# Patient Record
Sex: FEMALE | Race: WHITE | NOT HISPANIC OR LATINO | Employment: STUDENT | URBAN - METROPOLITAN AREA
[De-identification: names, ages, dates, MRNs, and addresses within clinical notes are randomized per-mention and may not be internally consistent; named-entity substitution may affect disease eponyms.]

---
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
## 2018-01-03 ENCOUNTER — GENERIC CONVERSION - ENCOUNTER (OUTPATIENT)
Dept: OTHER | Facility: OTHER | Age: 18
End: 2018-01-03

## 2018-01-03 ENCOUNTER — APPOINTMENT (OUTPATIENT)
Dept: LAB | Facility: AMBULARY SURGERY CENTER | Age: 18
End: 2018-01-03
Attending: OBSTETRICS & GYNECOLOGY
Payer: COMMERCIAL

## 2018-01-03 DIAGNOSIS — Z11.3 ENCOUNTER FOR SCREENING FOR INFECTIONS WITH PREDOMINANTLY SEXUAL MODE OF TRANSMISSION: ICD-10-CM

## 2018-01-03 LAB
CHLAMYDIA DNA CVX QL NAA+PROBE: NORMAL
HBV SURFACE AG SER QL: NORMAL
N GONORRHOEA DNA GENITAL QL NAA+PROBE: NORMAL

## 2018-01-03 PROCEDURE — 87491 CHLMYD TRACH DNA AMP PROBE: CPT

## 2018-01-03 PROCEDURE — 87591 N.GONORRHOEAE DNA AMP PROB: CPT

## 2018-01-03 PROCEDURE — 36415 COLL VENOUS BLD VENIPUNCTURE: CPT

## 2018-01-03 PROCEDURE — 87389 HIV-1 AG W/HIV-1&-2 AB AG IA: CPT

## 2018-01-03 PROCEDURE — 87340 HEPATITIS B SURFACE AG IA: CPT

## 2018-01-03 PROCEDURE — 86592 SYPHILIS TEST NON-TREP QUAL: CPT

## 2018-01-04 LAB — RPR SER QL: NORMAL

## 2018-01-05 LAB — HIV 1+2 AB+HIV1 P24 AG SERPL QL IA: NORMAL

## 2018-01-10 ENCOUNTER — GENERIC CONVERSION - ENCOUNTER (OUTPATIENT)
Dept: OTHER | Facility: OTHER | Age: 18
End: 2018-01-10

## 2018-01-12 NOTE — PROGRESS NOTES
Assessment    1  Never a smoker   2  Encounter for preventive health examination (V70 0) (Z00 00)   3  Exercise-induced asthma (493 81) (J06 990)    Plan   Exercise-induced asthma    · Ventolin  (90 Base) MCG/ACT Inhalation Aerosol Solution; INHALE 2  PUFFS EVERY 4-6 HOURS AS NEEDED   · Avoid  exposure to cigarette smoke ; Status:Complete;   Done: 78HGW3722   · Avoid exposure to household dust, animal dander, and molds ; Status:Complete;   Done:  63LAA3395   · Avoid exposure to things that make your problem worse ; Status:Complete;   Done:  52RPV7458   · Monitor daily; Status:Active; Requested for:06Fgo2261;    · Several things can be done to allergy-proof the bedroom:; Status:Complete;   Done:  54IVI3717   · To use an inhaler:; Status:Complete;   Done: 94UDI5781   · Call 911 if: Nuala Crass are too short of breath to talk, you can speak only one or two words  between breaths, or your lips or nails look blue ; Status:Complete;   Done: 56PII6211   · Seek Immediate Medical Attention if: You are having trouble staying awake ;  Status:Complete;   Done: 06MTC0831   · Seek Immediate Medical Attention if: You have difficulty breathing, or you are short of  breath more often ; Status:Complete;   Done: 68MXN3128   · Seek Immediate Medical Attention if: Your chest pain is getting worse ; Status:Complete;    Done: 16RGG3414  Health Maintenance    · Always use a seat belt and shoulder strap when riding or driving a motor vehicle ;  Status:Complete;   Done: 73DCH5361   · Begin a limited exercise program ; Status:Complete;   Done: 50MPG3486   · Begin or continue regular aerobic exercise   Gradually work up to at least 3 sessions of 30  minutes of exercise a week ; Status:Complete;   Done: 20DUN2361   · Brush your teeth 3 times a day and floss at least once a day ; Status:Complete;   Done:  67DPF2050   · Drink plenty of fluids ; Status:Complete;   Done: 15QNF9620   · Eat a normal well-balanced diet ; Status:Complete;   Done: 94YZH5786   · Stretch and warm up your muscles during the first 10 minutes , then cool down your  muscles for the last 10 minutes of exercise ; Status:Complete;   Done: 05DGH3357   · There are many ways to reduce your risk of catching or spreading a sexually transmitted  Infection ; Status:Complete;   Done: 50PYF9150   · Use a sun block product with an SPF of 15 or more ; Status:Complete;   Done:  95PZH7940   · We encourage all of our patients to exercise regularly  30 minutes of exercise or physical  activity five or more days a week is recommended for children and adults ;  Status:Complete;   Done: 77NZP3623   · We recommend regular contraceptive use to prevent an unplanned pregnancy ;  Status:Complete;   Done: 99YHK3629   · We recommend routine visits to a dentist ; Status:Complete;   Done: 28DLE1514   · We recommend that you follow these rules for gun safety ; Status:Complete;   Done:  23NHM3024   · Follow-up visit in 1 year Evaluation and Treatment  Follow-up  Status: Complete  Done:  09HDY3687    SNELLEN VISION- POC; Status:Resulted - Requires Verification;   Done: 36FZT3527 12:00AM  Due:73Phu1310; Last Updated Willam Hanks; 8/14/2017 11:16:01 AM;Ordered; Today;    For:Health Maintenance; Ordered By:Iris Salas;      Discussion/Summary  health maintenance visit Currently, she eats a healthy diet  The immunizations are up to date  Advice and education were given regarding nutrition, aerobic exercise, vitamin D supplements, reproductive health, contraception, sunscreen use, self skin examination, helmet use and seat belt use  Patient discussion: discussed with the patient  The patient was counseled regarding instructions for management, patient and family education  The treatment plan was reviewed with the patient/guardian   The patient/guardian understands and agrees with the treatment plan      Chief Complaint  21 yr old patient present today for wellness exam       History of Present Illness  HM, Adult Female: The patient is being seen for a health maintenance evaluation  The last health maintenance visit was 2 year(s) ago  General Health: The patient's health since the last visit is described as good  She has regular dental visits  The patient brushes 2 time(s) a day  Lifestyle:  She consumes a diverse and healthy diet  She does not have any weight concerns  She exercises regularly  She does not use tobacco  She denies alcohol use  She denies drug use  Reproductive health:  she reports normal menses  she is not sexually active  Screening:      Review of Systems    Constitutional: no fever  Eyes: no eyesight problems  ENT: no sore throat and no nasal discharge  Respiratory: no cough and no wheezing  Gastrointestinal: no abdominal pain, no nausea, no vomiting, no constipation and no diarrhea  Genitourinary: no dysuria  Musculoskeletal: no myalgias  Integumentary: no rashes  Neurological: no headache  Active Problems    1  Exercise-induced asthma (493 81) (J45 990)    Past Medical History    · History of gastroenteritis (V12 79) (Z87 19)   · History of headache (V13 89) (Z87 898)   · History of pneumonia (V12 61) (Z87 01)   · History of Mononucleosis (075) (B27 90)    Surgical History    · History of Oral Surgery Tooth Extraction    Family History  Mother    · No pertinent family history  Father    · No pertinent family history    Social History    · Activities: Basketball   · Activities: Soccer   · Brushes teeth daily   · Never a smoker   · No tobacco/smoke exposure   · Seeing a dentist   · Sleeps 8 - 10 hours a day   · Student    Current Meds   1  AeroChamber Mini Chamber Device; USE WITH VENTOLIN HFA AS DIRECTED; Therapy: 21HNK5973 to (Evaluate:04Ssp0947)  Requested for: 20Jul2015; Last   Rx:20Jul2015 Ordered   2  Ventolin  (90 Base) MCG/ACT Inhalation Aerosol Solution; 2 puffs 20 min prior to   exercise;    Therapy: 63Ums3669 to (Last Rx:20Jul2015) Requested for: 22Jaa2793 Ordered    Allergies    1  No Known Drug Allergies    2  No Known Environmental Allergies   3  No Known Food Allergies    Vitals   Recorded: [id number] 10:59AM Recorded: 58ZRV9703 10:50AM   Heart Rate 82    Respiration 18    Systolic 260    Diastolic 64    Height  5 ft 7 25 in   Weight  162 lb    BMI Calculated  25 18   BSA Calculated  1 85   BMI Percentile  80 %   2-20 Stature Percentile  88 %   2-20 Weight Percentile  89 %     Physical Exam    Constitutional   General appearance: No acute distress, well appearing and well nourished  Head and Face   Head and face: Normal     Palpation of the face and sinuses: No sinus tenderness  Eyes   Conjunctiva and lids: No swelling, erythema or discharge  Pupils and irises: Equal, round, reactive to light  Ears, Nose, Mouth, and Throat   External inspection of ears and nose: Normal     Otoscopic examination: Tympanic membranes translucent with normal light reflex  Canals patent without erythema  Nasal mucosa, septum, and turbinates: Normal without edema or erythema  Lips, teeth, and gums: Normal, good dentition  Oropharynx: Normal with no erythema, edema, exudate or lesions  Neck   Neck: Supple, symmetric, trachea midline, no masses  Pulmonary   Respiratory effort: No increased work of breathing or signs of respiratory distress  Auscultation of lungs: Clear to auscultation  Cardiovascular   Auscultation of heart: Normal rate and rhythm, normal S1 and S2, no murmurs  Pedal pulses: 2+ bilaterally  Examination of extremities for edema and/or varicosities: Normal     Chest   Breasts: Normal, no dimpling or skin changes appreciated  Palpation of breasts and axillae: Normal, no masses palpated  Abdomen   Abdomen: Non-tender, no masses  Liver and spleen: No hepatomegaly or splenomegaly  Lymphatic   Palpation of lymph nodes in neck: No lymphadenopathy      Musculoskeletal   Gait and station: Normal     Digits and nails: Normal without clubbing or cyanosis  Joints, bones, and muscles: Normal     Range of motion: Normal     Muscle strength/tone: Normal     Skin   Skin and subcutaneous tissue: Normal without rashes or lesions  Neurologic   Sensation: No sensory loss  Psychiatric   Mood and affect: Normal        Signatures   Electronically signed by : Ibis Osullivan;  Aug 14 2017 11:46AM EST                       (Author)    Electronically signed by : Benito Parr MD; Aug 14 2017 10:07PM EST                       (Co-author)

## 2018-01-14 VITALS
WEIGHT: 162 LBS | DIASTOLIC BLOOD PRESSURE: 64 MMHG | SYSTOLIC BLOOD PRESSURE: 102 MMHG | HEIGHT: 67 IN | BODY MASS INDEX: 25.43 KG/M2 | RESPIRATION RATE: 18 BRPM | HEART RATE: 82 BPM

## 2018-01-23 NOTE — MISCELLANEOUS
Message   Recorded as Task   Date: 01/07/2018 03:41 PM, Created By: Johnanna Dubin   Task Name: Follow Up   Assigned To: Vito Slaughter   Regarding Patient: Melly Holder, Status: Active   CommentKermitt Foyer - 07 Jan 2018 3:41 PM     TASK CREATED  please call  STI testing all neg   Vito Slaughter - 08 Jan 2018 1:43 PM     TASK EDITED  Mailbox is full unable to leave message at this time  Vito Slaughter - 10 Sandor 2018 2:36 PM     TASK EDITED  Spoke with patient reviewed negative STI testing  Pt verbalized understanding  Active Problems    1  Contraception (V25 9) (Z30 9)   2  Exercise-induced asthma (493 81) (J45 990)   3  Screen for STD (sexually transmitted disease) (V74 5) (Z11 3)    Current Meds   1  AeroChamber Mini Chamber Device; USE WITH VENTOLIN HFA AS DIRECTED; Therapy: 62AKO0159 to (Evaluate:35Aiu7615)  Requested for: 06Ivx9104; Last   Rx:18Ghe9064 Ordered   2  Ventolin  (90 Base) MCG/ACT Inhalation Aerosol Solution; 2 puffs 20 min prior to   exercise; Therapy: 14Mlt0657 to (Last Rx:57Nzt5296)  Requested for: 46Ina4576 Ordered   3  Ventolin  (90 Base) MCG/ACT Inhalation Aerosol Solution; INHALE 2 PUFFS   EVERY 4-6 HOURS AS NEEDED; Therapy: 96XTR7479 to (Last Rx:26Grn8124)  Requested for: 20Jox5300 Ordered    Allergies    1  No Known Drug Allergies    2  No Known Environmental Allergies   3   No Known Food Allergies    Signatures   Electronically signed by : Latrice Gilliam, ; Sandor 10 2018  2:36PM EST                       (Author)

## 2018-01-24 VITALS
RESPIRATION RATE: 16 BRPM | BODY MASS INDEX: 25.11 KG/M2 | WEIGHT: 160 LBS | DIASTOLIC BLOOD PRESSURE: 82 MMHG | HEIGHT: 67 IN | SYSTOLIC BLOOD PRESSURE: 130 MMHG | HEART RATE: 64 BPM

## 2018-04-10 ENCOUNTER — OFFICE VISIT (OUTPATIENT)
Dept: OBGYN CLINIC | Facility: CLINIC | Age: 18
End: 2018-04-10
Payer: COMMERCIAL

## 2018-04-10 VITALS — BODY MASS INDEX: 26.49 KG/M2 | HEIGHT: 67 IN | WEIGHT: 168.8 LBS

## 2018-04-10 DIAGNOSIS — Z30.015 ENCOUNTER FOR INITIAL PRESCRIPTION OF VAGINAL RING HORMONAL CONTRACEPTIVE: Primary | ICD-10-CM

## 2018-04-10 PROCEDURE — 99214 OFFICE O/P EST MOD 30 MIN: CPT | Performed by: OBSTETRICS & GYNECOLOGY

## 2018-04-10 RX ORDER — ETONOGESTREL AND ETHINYL ESTRADIOL 11.7; 2.7 MG/1; MG/1
INSERT, EXTENDED RELEASE VAGINAL
Qty: 1 EACH | Refills: 3 | Status: SHIPPED | OUTPATIENT
Start: 2018-04-10

## 2018-04-10 RX ORDER — ALBUTEROL SULFATE 90 UG/1
AEROSOL, METERED RESPIRATORY (INHALATION)
COMMUNITY
Start: 2014-08-28

## 2018-04-10 RX ORDER — ETONOGESTREL AND ETHINYL ESTRADIOL 11.7; 2.7 MG/1; MG/1
INSERT, EXTENDED RELEASE VAGINAL
Qty: 1 EACH | Refills: 0 | Status: SHIPPED | COMMUNITY
Start: 2018-04-10 | End: 2018-08-14 | Stop reason: SDUPTHER

## 2018-04-10 NOTE — PROGRESS NOTES
Assessment     21 y o , changing to NuvaRing vaginal inserts, no contraindications  Time spent counseling -30 min regarding the various forms of contraception  We reviewed oral contraceptive pills, NuvaRing, birth control patch, Depo-Provera, Nexplanon and IUDs  Given that the patient has had such difficulty remembering to take her birth control pills she would like something that is less user dependent  After reviewing the risks and benefits of all forms of contraception she would like to use the NuvaRing  We reviewed when to start the NuvaRing and had a use it  We reviewed with the most common side effects  She will return in 3 months for follow-up    Plan  Start NuvaRing  Follow-up in 3 months    Subjective      Gunner Mak is a 21 y o  female who presents for contraception counseling  The patient has no complaints today  She does report that she stop the birth control pill though because she was often forgetting them  She had no problems will taking the pill other than forgetting to take the pill  She would like to discuss alternative forms of birth control that are less user dependent  The patient is sexually active  Pertinent past medical history: none  Menstrual History:  OB History      Para Term  AB Living    0 0 0 0 0 0    SAB TAB Ectopic Multiple Live Births    0 0 0 0 0         No LMP recorded (within months)  The following portions of the patient's history were reviewed and updated as appropriate: allergies, current medications, past family history, past medical history, past social history, past surgical history and problem list     Review of Systems  Pertinent items are noted in HPI       Objective      Ht 5' 7" (1 702 m)   Wt 76 6 kg (168 lb 12 8 oz)   LMP  (Within Months) Comment: within last month  BMI 26 44 kg/m²     General:   alert and oriented, in no acute distress   Heart: regular rate and rhythm, S1, S2 normal, no murmur, click, rub or gallop   Lungs: clear to auscultation bilaterally   Abdomen: soft

## 2018-08-14 ENCOUNTER — OFFICE VISIT (OUTPATIENT)
Dept: OBGYN CLINIC | Facility: CLINIC | Age: 18
End: 2018-08-14
Payer: COMMERCIAL

## 2018-08-14 VITALS
SYSTOLIC BLOOD PRESSURE: 110 MMHG | HEIGHT: 67 IN | DIASTOLIC BLOOD PRESSURE: 66 MMHG | WEIGHT: 165 LBS | BODY MASS INDEX: 25.9 KG/M2

## 2018-08-14 DIAGNOSIS — Z30.015 ENCOUNTER FOR INITIAL PRESCRIPTION OF VAGINAL RING HORMONAL CONTRACEPTIVE: ICD-10-CM

## 2018-08-14 PROCEDURE — 99213 OFFICE O/P EST LOW 20 MIN: CPT | Performed by: OBSTETRICS & GYNECOLOGY

## 2018-08-14 RX ORDER — ETONOGESTREL AND ETHINYL ESTRADIOL 11.7; 2.7 MG/1; MG/1
INSERT, EXTENDED RELEASE VAGINAL
Qty: 3 EACH | Refills: 3 | Status: SHIPPED | OUTPATIENT
Start: 2018-08-14

## 2018-08-14 NOTE — PROGRESS NOTES
Assessment     19 y o , continuing NuvaRing vaginal inserts, no contraindications  Plan     Continue NuvaRing    Follow-up in 1 year for initiation of Pap screening    Shanelle Hyde is a 19 y o  female who presents for contraception follow-up  She has been using the NuvaRing for the last 3 months  The patient has no complaints today  She has a regular monthly periods that last approximately 5 days  It is not heavy  She has no intermenstrual spotting  She has no difficulty remembering to place or change the ring  The ring does not fall out  The patient is sexually active  She declines STI screening  She would like to continue the NuvaRing  Pertinent past medical history: none  Menstrual History:  OB History      Para Term  AB Living    0 0 0 0 0 0    SAB TAB Ectopic Multiple Live Births    0 0 0 0 0         Patient's last menstrual period was 2018         The following portions of the patient's history were reviewed and updated as appropriate: allergies, current medications, past family history, past medical history, past social history, past surgical history and problem list     Review of Systems  Constitutional: negative  Respiratory: negative  Cardiovascular: negative  Gastrointestinal: negative  Genitourinary:negative  Neurological: negative  Behavioral/Psych: negative     Objective      /66 (BP Location: Right arm, Patient Position: Sitting, Cuff Size: Standard)   Ht 5' 6 5" (1 689 m)   Wt 74 8 kg (165 lb)   LMP 2018   BMI 26 23 kg/m²     General:   alert and oriented, in no acute distress   Heart: regular rate and rhythm, S1, S2 normal, no murmur, click, rub or gallop   Lungs: clear to auscultation bilaterally   Abdomen: soft, non-tender, without masses or organomegaly

## 2018-08-20 ENCOUNTER — TELEPHONE (OUTPATIENT)
Dept: OBGYN CLINIC | Facility: CLINIC | Age: 18
End: 2018-08-20

## 2018-08-20 NOTE — TELEPHONE ENCOUNTER
Patient left message on nurse line state her nuva ring prescription needs to be sent to mail order pharmacy  Returned call to patient  Pt is unsure what mail order pharmacy she is to use  Will call back with the correct pharmacy name

## 2018-08-23 NOTE — TELEPHONE ENCOUNTER
Patient called back would like prescription sent to [facility]  Advised to have [facility] transfer prescription from [facility]  Verbalized understanding

## 2021-02-11 DIAGNOSIS — Z23 ENCOUNTER FOR IMMUNIZATION: ICD-10-CM

## 2021-02-15 ENCOUNTER — IMMUNIZATIONS (OUTPATIENT)
Dept: FAMILY MEDICINE CLINIC | Facility: HOSPITAL | Age: 21
End: 2021-02-15

## 2021-02-15 DIAGNOSIS — Z23 ENCOUNTER FOR IMMUNIZATION: Primary | ICD-10-CM

## 2021-02-15 PROCEDURE — 0001A SARS-COV-2 / COVID-19 MRNA VACCINE (PFIZER-BIONTECH) 30 MCG: CPT

## 2021-02-15 PROCEDURE — 91300 SARS-COV-2 / COVID-19 MRNA VACCINE (PFIZER-BIONTECH) 30 MCG: CPT

## 2021-03-08 ENCOUNTER — IMMUNIZATIONS (OUTPATIENT)
Dept: FAMILY MEDICINE CLINIC | Facility: HOSPITAL | Age: 21
End: 2021-03-08

## 2021-03-08 DIAGNOSIS — Z23 ENCOUNTER FOR IMMUNIZATION: Primary | ICD-10-CM

## 2021-03-08 PROCEDURE — 91300 SARS-COV-2 / COVID-19 MRNA VACCINE (PFIZER-BIONTECH) 30 MCG: CPT

## 2021-03-08 PROCEDURE — 0002A SARS-COV-2 / COVID-19 MRNA VACCINE (PFIZER-BIONTECH) 30 MCG: CPT

## 2022-09-01 ENCOUNTER — OFFICE VISIT (OUTPATIENT)
Dept: INTERNAL MEDICINE CLINIC | Facility: CLINIC | Age: 22
End: 2022-09-01
Payer: COMMERCIAL

## 2022-09-01 VITALS
HEART RATE: 83 BPM | OXYGEN SATURATION: 98 % | WEIGHT: 164 LBS | SYSTOLIC BLOOD PRESSURE: 114 MMHG | DIASTOLIC BLOOD PRESSURE: 60 MMHG | HEIGHT: 67 IN | TEMPERATURE: 97.7 F | RESPIRATION RATE: 16 BRPM | BODY MASS INDEX: 25.74 KG/M2

## 2022-09-01 DIAGNOSIS — Z00.00 LABORATORY EXAMINATION ORDERED AS PART OF A ROUTINE GENERAL MEDICAL EXAMINATION: ICD-10-CM

## 2022-09-01 DIAGNOSIS — N92.6 IRREGULAR MENSTRUAL BLEEDING: ICD-10-CM

## 2022-09-01 DIAGNOSIS — Z72.0 VAPES NICOTINE CONTAINING SUBSTANCE: ICD-10-CM

## 2022-09-01 DIAGNOSIS — Z00.00 HEALTH MAINTENANCE EXAMINATION: Primary | ICD-10-CM

## 2022-09-01 DIAGNOSIS — D22.9 MULTIPLE NEVI: ICD-10-CM

## 2022-09-01 DIAGNOSIS — R19.7 DIARRHEA, UNSPECIFIED TYPE: ICD-10-CM

## 2022-09-01 PROCEDURE — 3725F SCREEN DEPRESSION PERFORMED: CPT | Performed by: INTERNAL MEDICINE

## 2022-09-01 PROCEDURE — 99385 PREV VISIT NEW AGE 18-39: CPT | Performed by: INTERNAL MEDICINE

## 2022-09-01 NOTE — PROGRESS NOTES
Assessment/Plan:    Diarrhea  Differential Dx: lactose or gluten sensitivity, IBS with diarrhea  Recommend to start food diary  Screen for Celiac  Refer to GI  Irregular menstrual bleeding  Previously on Nuvaring  Refer back to gynecology  Exercise-induced asthma  No symptoms  Multiple nevi  Recommend to see dermatology  (+) family history of melanoma  Vapes nicotine containing substance  Discussed smoking cessation  Recommend nicotine lozenges  Diagnoses and all orders for this visit:    Health maintenance examination  Comments:  Updated  Diarrhea, unspecified type  -     CBC and differential  -     Comprehensive metabolic panel  -     Celiac Disease Antibody Profile  -     Ambulatory Referral to Gastroenterology; Future    Irregular menstrual bleeding  -     TSH, 3rd generation with Free T4 reflex    Laboratory examination ordered as part of a routine general medical examination  -     CBC and differential  -     Comprehensive metabolic panel  -     TSH, 3rd generation with Free T4 reflex  -     Celiac Disease Antibody Profile    Multiple nevi  -     Ambulatory Referral to Dermatology; Future    Vapes nicotine containing substance    Follow up in 6 months or as needed  Subjective:      Patient ID: Jamal Collet is a 23 y o  female here to establish care  She reports that in the past 5 years, she is having bowel issues  She would experience intermittent diarrhea, occasionally accompanied by bloating and cramping  She may have urgency sometimes  It usually occurs after a meal, no incontinence  Denies any nausea or vomiting, no bloody stools  Stools always with solids, no mucus  She had noticed that it would usually occur if she eats out or eating processed foods  She has tried multiple over the counter products including fiber and probiotics which did not help  She also complains of irregular menstrual periods  She had the Nuvaring in the past but had side effects   She reports periods can occur every 50 days, last between 8 to 14 days  It can be heavy or light  She has not seen gynecology the past few years  She vapes daily, does not drink alcohol  She reports occasional anxiety but manageable  Abdominal Pain  This is a chronic problem  The current episode started more than 1 year ago  The onset quality is sudden  The problem occurs daily  The most recent episode lasted 5 hours  The problem has been unchanged  The pain is located in the generalized abdominal region  The pain is at a severity of 5/10  The quality of the pain is burning and a sensation of fullness  The abdominal pain does not radiate  Associated symptoms include anorexia, constipation, diarrhea and flatus  Pertinent negatives include no arthralgias, belching, dysuria, fever, frequency, headaches, hematochezia, hematuria, melena, myalgias, nausea, vomiting or weight loss  Nothing aggravates the pain  The pain is relieved by nothing  The following portions of the patient's history were reviewed and updated as appropriate: allergies, current medications, past family history, past medical history, past social history, past surgical history and problem list     Past Medical History:   Diagnosis Date    Asthma      History reviewed  No pertinent surgical history  Family History   Problem Relation Age of Onset    No Known Problems Mother     No Known Problems Father     Diabetes Maternal Grandfather     Melanoma Maternal Grandfather      Social History     Socioeconomic History    Marital status: Single     Spouse name: Not on file    Number of children: Not on file    Years of education: Not on file    Highest education level: Not on file   Occupational History    Not on file   Tobacco Use    Smoking status: Never Smoker    Smokeless tobacco: Never Used   Vaping Use    Vaping Use: Every day   Substance and Sexual Activity    Alcohol use:  Yes    Drug use: No    Sexual activity: Never     Birth control/protection: None   Other Topics Concern    Not on file   Social History Narrative    Single    Working as MA at [employer], part time as EMT     Social Determinants of Health     Financial Resource Strain: Not on file   Food Insecurity: Not on file   Transportation Needs: Not on file   Physical Activity: Not on file   Stress: Not on file   Social Connections: Not on file   Intimate Partner Violence: Not on file   Housing Stability: Not on file     No current outpatient medications on file  No Known Allergies      Review of Systems   Constitutional: Negative for appetite change, fatigue, fever and weight loss  HENT: Negative for congestion, ear pain and postnasal drip  Eyes: Negative for visual disturbance  Respiratory: Negative for cough and shortness of breath  Cardiovascular: Negative for chest pain and leg swelling  Gastrointestinal: Positive for abdominal pain, anorexia, constipation, diarrhea and flatus  Negative for hematochezia, melena, nausea and vomiting  Genitourinary: Negative for dysuria, frequency, hematuria and urgency  Musculoskeletal: Negative for arthralgias and myalgias  Skin: Negative for rash and wound  Neurological: Negative for dizziness, numbness and headaches  Psychiatric/Behavioral: Negative for confusion and sleep disturbance  The patient is not nervous/anxious  Objective:      /60   Pulse 83   Temp 97 7 °F (36 5 °C)   Resp 16   Ht 5' 7" (1 702 m)   Wt 74 4 kg (164 lb)   SpO2 98%   BMI 25 69 kg/m²          Physical Exam  Vitals and nursing note reviewed  Constitutional:       Appearance: She is well-developed  HENT:      Head: Normocephalic and atraumatic  Right Ear: Tympanic membrane, ear canal and external ear normal       Left Ear: Tympanic membrane, ear canal and external ear normal       Nose: Nose normal       Mouth/Throat:      Pharynx: Uvula midline     Eyes:      Conjunctiva/sclera: Conjunctivae normal       Pupils: Pupils are equal, round, and reactive to light  Neck:      Thyroid: No thyroid mass  Cardiovascular:      Rate and Rhythm: Normal rate and regular rhythm  Heart sounds: Normal heart sounds  Pulmonary:      Effort: Pulmonary effort is normal       Breath sounds: Normal breath sounds  No wheezing or rhonchi  Chest:   Breasts:      Right: No supraclavicular adenopathy  Left: No supraclavicular adenopathy  Abdominal:      General: Bowel sounds are normal  There is no distension  Palpations: Abdomen is soft  Tenderness: There is no abdominal tenderness  There is no guarding or rebound  Hernia: No hernia is present  Musculoskeletal:         General: Normal range of motion  Cervical back: Neck supple  Comments: No joint pain or swelling   Lymphadenopathy:      Cervical: No cervical adenopathy  Upper Body:      Right upper body: No supraclavicular adenopathy  Left upper body: No supraclavicular adenopathy  Skin:     General: Skin is warm  Findings: No rash or wound  Neurological:      General: No focal deficit present  Mental Status: She is alert and oriented to person, place, and time  Cranial Nerves: No cranial nerve deficit  Psychiatric:         Mood and Affect: Mood normal          Behavior: Behavior normal          Reviewed available records  BMI Counseling: Body mass index is 25 69 kg/m²  The BMI is above normal  Nutrition recommendations include 3-5 servings of fruits/vegetables daily and consuming healthier snacks  Exercise recommendations include exercising 3-5 times per week and strength training exercises  Depression Screening Follow-up Plan: Patient's depression screening was positive with a PHQ-2 score of 4  Their PHQ-9 score was 14  Patient assessed for underlying major depression  They have no active suicidal ideations   Brief counseling provided and recommend additional follow-up/re-evaluation next office visit

## 2022-09-01 NOTE — ASSESSMENT & PLAN NOTE
Differential Dx: lactose or gluten sensitivity, IBS with diarrhea  Recommend to start food diary  Screen for Celiac  Refer to GI

## 2022-09-03 LAB
ALBUMIN SERPL-MCNC: 4.8 G/DL (ref 3.6–5.1)
ALBUMIN/GLOB SERPL: 1.6 (CALC) (ref 1–2.5)
ALP SERPL-CCNC: 50 U/L (ref 31–125)
ALT SERPL-CCNC: 11 U/L (ref 6–29)
AST SERPL-CCNC: 14 U/L (ref 10–30)
BASOPHILS # BLD AUTO: 17 CELLS/UL (ref 0–200)
BASOPHILS NFR BLD AUTO: 0.2 %
BILIRUB SERPL-MCNC: 0.4 MG/DL (ref 0.2–1.2)
BUN SERPL-MCNC: 12 MG/DL (ref 7–25)
BUN/CREAT SERPL: NORMAL (CALC) (ref 6–22)
CALCIUM SERPL-MCNC: 10.1 MG/DL (ref 8.6–10.2)
CHLORIDE SERPL-SCNC: 105 MMOL/L (ref 98–110)
CO2 SERPL-SCNC: 25 MMOL/L (ref 20–32)
CREAT SERPL-MCNC: 0.64 MG/DL (ref 0.5–0.96)
EOSINOPHIL # BLD AUTO: 60 CELLS/UL (ref 15–500)
EOSINOPHIL NFR BLD AUTO: 0.7 %
ERYTHROCYTE [DISTWIDTH] IN BLOOD BY AUTOMATED COUNT: 12.7 % (ref 11–15)
GFR/BSA.PRED SERPLBLD CYS-BASED-ARV: 126 ML/MIN/1.73M2
GLOBULIN SER CALC-MCNC: 3 G/DL (CALC) (ref 1.9–3.7)
GLUCOSE SERPL-MCNC: 101 MG/DL (ref 65–139)
HCT VFR BLD AUTO: 39.2 % (ref 35–45)
HGB BLD-MCNC: 13.1 G/DL (ref 11.7–15.5)
LYMPHOCYTES # BLD AUTO: 2425 CELLS/UL (ref 850–3900)
LYMPHOCYTES NFR BLD AUTO: 28.2 %
MCH RBC QN AUTO: 28.6 PG (ref 27–33)
MCHC RBC AUTO-ENTMCNC: 33.4 G/DL (ref 32–36)
MCV RBC AUTO: 85.6 FL (ref 80–100)
MONOCYTES # BLD AUTO: 533 CELLS/UL (ref 200–950)
MONOCYTES NFR BLD AUTO: 6.2 %
NEUTROPHILS # BLD AUTO: 5564 CELLS/UL (ref 1500–7800)
NEUTROPHILS NFR BLD AUTO: 64.7 %
PLATELET # BLD AUTO: 308 THOUSAND/UL (ref 140–400)
PMV BLD REES-ECKER: 11.8 FL (ref 7.5–12.5)
POTASSIUM SERPL-SCNC: 4 MMOL/L (ref 3.5–5.3)
PROT SERPL-MCNC: 7.8 G/DL (ref 6.1–8.1)
RBC # BLD AUTO: 4.58 MILLION/UL (ref 3.8–5.1)
SODIUM SERPL-SCNC: 140 MMOL/L (ref 135–146)
TSH SERPL-ACNC: 1.94 MIU/L
WBC # BLD AUTO: 8.6 THOUSAND/UL (ref 3.8–10.8)

## 2022-09-10 ENCOUNTER — TELEPHONE (OUTPATIENT)
Dept: INTERNAL MEDICINE CLINIC | Facility: CLINIC | Age: 22
End: 2022-09-10

## 2022-09-10 LAB
ALBUMIN SERPL-MCNC: 4.8 G/DL (ref 3.6–5.1)
ALBUMIN/GLOB SERPL: 1.6 (CALC) (ref 1–2.5)
ALP SERPL-CCNC: 50 U/L (ref 31–125)
ALT SERPL-CCNC: 11 U/L (ref 6–29)
AST SERPL-CCNC: 14 U/L (ref 10–30)
BASOPHILS # BLD AUTO: 17 CELLS/UL (ref 0–200)
BASOPHILS NFR BLD AUTO: 0.2 %
BILIRUB SERPL-MCNC: 0.4 MG/DL (ref 0.2–1.2)
BUN SERPL-MCNC: 12 MG/DL (ref 7–25)
BUN/CREAT SERPL: NORMAL (CALC) (ref 6–22)
CALCIUM SERPL-MCNC: 10.1 MG/DL (ref 8.6–10.2)
CHLORIDE SERPL-SCNC: 105 MMOL/L (ref 98–110)
CO2 SERPL-SCNC: 25 MMOL/L (ref 20–32)
CREAT SERPL-MCNC: 0.64 MG/DL (ref 0.5–0.96)
EOSINOPHIL # BLD AUTO: 60 CELLS/UL (ref 15–500)
EOSINOPHIL NFR BLD AUTO: 0.7 %
ERYTHROCYTE [DISTWIDTH] IN BLOOD BY AUTOMATED COUNT: 12.7 % (ref 11–15)
GFR/BSA.PRED SERPLBLD CYS-BASED-ARV: 126 ML/MIN/1.73M2
GLOBULIN SER CALC-MCNC: 3 G/DL (CALC) (ref 1.9–3.7)
GLUCOSE SERPL-MCNC: 101 MG/DL (ref 65–139)
HCT VFR BLD AUTO: 39.2 % (ref 35–45)
HGB BLD-MCNC: 13.1 G/DL (ref 11.7–15.5)
IGA SERPL-MCNC: 176 MG/DL (ref 47–310)
LYMPHOCYTES # BLD AUTO: 2425 CELLS/UL (ref 850–3900)
LYMPHOCYTES NFR BLD AUTO: 28.2 %
MCH RBC QN AUTO: 28.6 PG (ref 27–33)
MCHC RBC AUTO-ENTMCNC: 33.4 G/DL (ref 32–36)
MCV RBC AUTO: 85.6 FL (ref 80–100)
MICRODELETION SYND BLD/T FISH: NORMAL
MONOCYTES # BLD AUTO: 533 CELLS/UL (ref 200–950)
MONOCYTES NFR BLD AUTO: 6.2 %
NEUTROPHILS # BLD AUTO: 5564 CELLS/UL (ref 1500–7800)
NEUTROPHILS NFR BLD AUTO: 64.7 %
PLATELET # BLD AUTO: 308 THOUSAND/UL (ref 140–400)
PMV BLD REES-ECKER: 11.8 FL (ref 7.5–12.5)
POTASSIUM SERPL-SCNC: 4 MMOL/L (ref 3.5–5.3)
PROT SERPL-MCNC: 7.8 G/DL (ref 6.1–8.1)
RBC # BLD AUTO: 4.58 MILLION/UL (ref 3.8–5.1)
SODIUM SERPL-SCNC: 140 MMOL/L (ref 135–146)
TSH SERPL-ACNC: 1.94 MIU/L
TTG IGA SER-ACNC: <1 U/ML
WBC # BLD AUTO: 8.6 THOUSAND/UL (ref 3.8–10.8)

## 2023-03-29 ENCOUNTER — OFFICE VISIT (OUTPATIENT)
Dept: GASTROENTEROLOGY | Facility: CLINIC | Age: 23
End: 2023-03-29

## 2023-03-29 VITALS
HEIGHT: 67 IN | DIASTOLIC BLOOD PRESSURE: 79 MMHG | BODY MASS INDEX: 26.93 KG/M2 | HEART RATE: 80 BPM | SYSTOLIC BLOOD PRESSURE: 132 MMHG | WEIGHT: 171.6 LBS | OXYGEN SATURATION: 100 %

## 2023-03-29 DIAGNOSIS — R14.0 ABDOMINAL BLOATING: ICD-10-CM

## 2023-03-29 DIAGNOSIS — R19.4 CHANGE IN BOWEL HABITS: Primary | ICD-10-CM

## 2023-03-29 DIAGNOSIS — R10.84 GENERALIZED ABDOMINAL PAIN: ICD-10-CM

## 2023-03-29 RX ORDER — SODIUM, POTASSIUM,MAG SULFATES 17.5-3.13G
177 SOLUTION, RECONSTITUTED, ORAL ORAL ONCE
Qty: 177 ML | Refills: 0 | Status: SHIPPED | OUTPATIENT
Start: 2023-03-29 | End: 2023-03-29

## 2023-03-29 RX ORDER — DICYCLOMINE HCL 20 MG
20 TABLET ORAL
Qty: 120 TABLET | Refills: 5 | Status: SHIPPED | OUTPATIENT
Start: 2023-03-29

## 2023-03-29 NOTE — PROGRESS NOTES
[facility] - Outpatient Consultation  Noe Vazquez 23 y o  female MRN: [id number]  Encounter: [id number]      PCP: Jerre Nissen, MD  Referring: Jerre Nissen, MD  [address] [place] [address],[address]  [facility],  [address] [place] [address]      ASSESSMENT AND PLAN:      1  Change in bowel habits  2  Generalized abdominal pain  3  Abdominal bloating  5 years of symptoms of diarrhea, associated with abdominal cramping/bloating; no warning signs/symptoms  Celiac labs are negative  Rule out organic disease with evaluation as below  - Colonoscopy; Future  - EGD; Future  - Na Sulfate-K Sulfate-Mg Sulf (Suprep Bowel Prep Kit) 17 5-3 13-1 6 GM/177ML SOLN; Take 177 mL by mouth once for 1 dose Take 177 mL by mouth once for 1 dose  Dispense: 177 mL; Refill: 0  - dicyclomine (BENTYL) 20 mg tablet; Take 1 tablet (20 mg total) by mouth 4 (four) times a day (before meals and at bedtime)  Dispense: 120 tablet; Refill: 5  - alternatives include rifaximin or IBGard      ______________________________________________________________________    CC:  Chief Complaint   Patient presents with   • Abdominal Pain     And bloating  Diarrhea and constipation  HPI:      Patient is a 68-year-old female who is referred for abdominal pain, bloating, change in bowel habits  She has irregular menstrual bleeding, exercise-induced asthma, multiple nevi, nicotine vaping  She reports 5 years of frequent bowel movements that occur 2-4 times a day  She describes loose in consistency  Symptoms have worsened over the last 1 to 2 years with increasing urgency  She also describes associated generalized abdominal cramping with fullness and abdominal bloating  She occasionally has days where she finds it difficult to have a bowel movement  She has been unsuccessful with dietary changes  She denies any nocturnal stools, rectal bleeding, family history of GI cancers  Celiac blood work was negative    Therapeutic failure of psyllium fiber    Abdominal Pain  This is a chronic problem  The current episode started more than 1 year ago  The onset quality is gradual  The problem occurs daily  The most recent episode lasted 1 Days  The problem has been gradually worsening  The pain is located in the generalized abdominal region  The pain is at a severity of 5/10  The quality of the pain is burning, cramping, a sensation of fullness and sharp  The abdominal pain does not radiate  Associated symptoms include anorexia, belching, constipation, diarrhea, flatus and nausea  Pertinent negatives include no arthralgias, dysuria, fever, frequency, headaches, hematochezia, hematuria, melena, myalgias, vomiting or weight loss  The pain is aggravated by drinking alcohol and eating  The pain is relieved by activity, bowel movements and movement  REVIEW OF SYSTEMS:    CONSTITUTIONAL: Denies any fever, chills, rigors, and weight loss  HEENT: No earache or tinnitus  Denies hearing loss or visual disturbances  CARDIOVASCULAR: No chest pain or palpitations  RESPIRATORY: Denies any cough, hemoptysis, shortness of breath or dyspnea on exertion  GASTROINTESTINAL: As noted in the History of Present Illness  GENITOURINARY: No problems with urination  Denies any hematuria or dysuria  NEUROLOGIC: No dizziness or vertigo, denies headaches  MUSCULOSKELETAL: Denies any muscle or joint pain  SKIN: Denies skin rashes or itching  ENDOCRINE: Denies excessive thirst  Denies intolerance to heat or cold  PSYCHOSOCIAL: Denies depression or anxiety  Denies any recent memory loss  Historical Information   Past Medical History:   Diagnosis Date   • Asthma      History reviewed  No pertinent surgical history    Social History   Social History     Substance and Sexual Activity   Alcohol Use Yes     Social History     Substance and Sexual Activity   Drug Use No     Social History     Tobacco Use   Smoking Status Never   Smokeless Tobacco Never     Family History "  Problem Relation Age of Onset   • No Known Problems Mother    • No Known Problems Father    • Diabetes Maternal Grandfather    • Melanoma Maternal Grandfather        Meds/Allergies     No current outpatient medications on file  No Known Allergies        Objective     Blood pressure 132/79, pulse 80, height 5' 7\" (1 702 m), weight 77 8 kg (171 lb 9 6 oz), SpO2 100 %  Body mass index is 26 88 kg/m²  PHYSICAL EXAM:      General Appearance:   Alert, cooperative, no distress   HEENT:   Normocephalic, atraumatic, anicteric  Neck:  Supple, symmetrical, trachea midline   Lungs:   Clear to auscultation bilaterally; no rales, rhonchi or wheezing; respirations unlabored    Heart[de-identified]   Regular rate and rhythm; no murmur, rub, or gallop  Abdomen:   Soft, non-tender, non-distended; normal bowel sounds; no masses, no organomegaly    Genitalia:   Deferred    Rectal:   Deferred    Extremities:  No cyanosis, clubbing or edema    Pulses:  2+ and symmetric    Skin:  No jaundice, rashes, or lesions    Lymph nodes:  No palpable cervical lymphadenopathy        Lab Results:     Lab Results   Component Value Date    WBC 8 6 09/02/2022    HGB 13 1 09/02/2022    HCT 39 2 09/02/2022    MCV 85 6 09/02/2022     09/02/2022       Lab Results   Component Value Date    K 4 0 09/02/2022     09/02/2022    CO2 25 09/02/2022    BUN 12 09/02/2022    CREATININE 0 64 09/02/2022    CALCIUM 10 1 09/02/2022    AST 14 09/02/2022    ALT 11 09/02/2022    ALKPHOS 50 09/02/2022    EGFR 126 09/02/2022       No results found for: INR, PROTIME      Radiology Results:   No results found  Portions of the record may have been created with voice recognition software  Occasional wrong word or \"sound a like\" substitutions may have occurred due to the inherent limitations of voice recognition software  Read the chart carefully and recognize, using context, where substitutions have occurred          "

## 2023-03-29 NOTE — PATIENT INSTRUCTIONS
Scheduled date of EGD/colonoscopy (as of today): 05/18/23  Physician performing EGD/colonoscopy: Ivanna Jorge  [facility] of EGD[facility]colonoscopy[facility]  Desired bowel prep reviewed with patient: Jeffkatia Vail  Instructions reviewed with patient by: [place]  Clearances: NONE          Colonoscopy- Suprep Instructions    WEEK BEFORE YOUR PROCEDURE:  Do not take Iron tablets for one week  5 days prior to the appointment AVOID vegetables and fruits with skins or seeds, nuts, corn, popcorn and whole grain breads  Pick your bowel prep from your pharmacy  DAY BEFORE THE PROCEDURE:   CLEAR liquids only for entire day prior  Nothing red, orange or  purple  You CAN have:  Soda  Water  Broth Gatorade  Jell-O  Popsicles Coffee/tea without milk/creamer   AVOID:  Solid foods   Milk and milk products    Juice with pulp      PREPARATION:   The SUPREP kit contains 2 bottles  Complete the entire prep  You may refrigerate the bottles  When to Take SUPREP®BOWEL PREP KIT  SUPREP Bowel Prep Kit is taken as a split-dose (2-day) regimen  You take the first 6-ounce bottle of SUPREP the evening before your colonoscopy and the second 6-ounce bottle of SUPREP the morning of your colonoscopy, or as otherwise directed by your physician  It is important to drink the additional water as recommended  How to Take SUPREP®BOWEL PREP KIT  In the evening before your procedure: Between 6 pm- 7pm, take the first dose of SUPREP  complete steps 1 through 4 using one (1) 6-ounce bottle before going to bed        In the morning on the day of your procedure, repeat steps 1 through 4 using the other 6-ounce bottle        Step 1  Pour ONE (1) 6-ounce bottle of SUPREP liquid into the mixing container  Step 3  Drink ALL the liquid in the container  Step 2  Add cool drinking water to the 16-ounce line on the container and mix  Note:Be sure to dilute SUPREP as shown before you drink it     Step 4  You must drink two (2) more 16-ounce containers of water over the next 1 hour  Note:You must finish drinking the final glass of water at least 2 hours, or as directed, before your procedure  Day of the Procedure: Nothing to eat or drink after midnight- except for your bowel prep  4 hours prior to your arrival time, take the second dose of SUPREP as instructed above  PLEASE DO NOT drink for 3 hours prior to your arrival time

## 2023-04-23 DIAGNOSIS — R10.84 GENERALIZED ABDOMINAL PAIN: ICD-10-CM

## 2023-04-23 DIAGNOSIS — R19.4 CHANGE IN BOWEL HABITS: ICD-10-CM

## 2023-04-23 DIAGNOSIS — R14.0 ABDOMINAL BLOATING: ICD-10-CM

## 2023-04-23 RX ORDER — DICYCLOMINE HCL 20 MG
20 TABLET ORAL
Qty: 360 TABLET | Refills: 2 | Status: SHIPPED | OUTPATIENT
Start: 2023-04-23

## 2023-04-28 ENCOUNTER — TELEPHONE (OUTPATIENT)
Dept: GASTROENTEROLOGY | Facility: AMBULARY SURGERY CENTER | Age: 23
End: 2023-04-28

## 2023-04-28 NOTE — TELEPHONE ENCOUNTER
Spoke w/ pt/pt states she needs to cancel egd/colonoscopy that was scheduled for 5/18/2023 w/ Dr Alyssia Sosa states she has been accepted into a school and has a different schedule now/pt states she will call back to reschedule when she is able to do so per pt